# Patient Record
Sex: FEMALE | URBAN - METROPOLITAN AREA
[De-identification: names, ages, dates, MRNs, and addresses within clinical notes are randomized per-mention and may not be internally consistent; named-entity substitution may affect disease eponyms.]

---

## 2024-02-29 ENCOUNTER — APPOINTMENT (RX ONLY)
Dept: URBAN - METROPOLITAN AREA CLINIC 132 | Facility: CLINIC | Age: 74
Setting detail: DERMATOLOGY
End: 2024-02-29

## 2024-02-29 DIAGNOSIS — L40.8 OTHER PSORIASIS: ICD-10-CM

## 2024-02-29 DIAGNOSIS — L82.0 INFLAMED SEBORRHEIC KERATOSIS: ICD-10-CM

## 2024-02-29 PROCEDURE — ? PRESCRIPTION

## 2024-02-29 PROCEDURE — 99203 OFFICE O/P NEW LOW 30 MIN: CPT

## 2024-02-29 PROCEDURE — ? COUNSELING

## 2024-02-29 RX ORDER — BETAMETHASONE DIPROPIONATE 0.5 MG/G
OINTMENT TOPICAL
Qty: 45 | Refills: 3 | Status: ERX | COMMUNITY
Start: 2024-02-29

## 2024-02-29 RX ADMIN — BETAMETHASONE DIPROPIONATE: 0.5 OINTMENT TOPICAL at 00:00

## 2024-02-29 ASSESSMENT — LOCATION ZONE DERM
LOCATION ZONE: NECK
LOCATION ZONE: FACE
LOCATION ZONE: SCALP

## 2024-02-29 ASSESSMENT — LOCATION SIMPLE DESCRIPTION DERM
LOCATION SIMPLE: POSTERIOR SCALP
LOCATION SIMPLE: RIGHT ANTERIOR NECK
LOCATION SIMPLE: LEFT ANTERIOR NECK
LOCATION SIMPLE: LEFT FOREHEAD

## 2024-02-29 ASSESSMENT — LOCATION DETAILED DESCRIPTION DERM
LOCATION DETAILED: LEFT POSTAURICULAR SKIN
LOCATION DETAILED: LEFT SUPERIOR ANTERIOR NECK
LOCATION DETAILED: LEFT MEDIAL FOREHEAD
LOCATION DETAILED: RIGHT SUPERIOR LATERAL NECK

## 2025-02-17 ENCOUNTER — APPOINTMENT (OUTPATIENT)
Dept: URBAN - METROPOLITAN AREA CLINIC 132 | Facility: CLINIC | Age: 75
Setting detail: DERMATOLOGY
End: 2025-02-17

## 2025-02-17 DIAGNOSIS — L30.9 DERMATITIS, UNSPECIFIED: ICD-10-CM

## 2025-02-17 PROCEDURE — ? BIOPSY BY PUNCH METHOD

## 2025-02-17 PROCEDURE — ? COUNSELING

## 2025-02-17 PROCEDURE — 11104 PUNCH BX SKIN SINGLE LESION: CPT

## 2025-02-17 ASSESSMENT — LOCATION DETAILED DESCRIPTION DERM
LOCATION DETAILED: PERIUMBILICAL SKIN
LOCATION DETAILED: RIGHT DISTAL DORSAL FOREARM
LOCATION DETAILED: RIGHT PROXIMAL PRETIBIAL REGION
LOCATION DETAILED: LEFT PROXIMAL DORSAL FOREARM
LOCATION DETAILED: LEFT LATERAL PROXIMAL PRETIBIAL REGION
LOCATION DETAILED: LEFT LATERAL PROXIMAL PRETIBIAL REGION

## 2025-02-17 ASSESSMENT — LOCATION SIMPLE DESCRIPTION DERM
LOCATION SIMPLE: RIGHT PRETIBIAL REGION
LOCATION SIMPLE: ABDOMEN
LOCATION SIMPLE: LEFT FOREARM
LOCATION SIMPLE: RIGHT FOREARM
LOCATION SIMPLE: LEFT PRETIBIAL REGION
LOCATION SIMPLE: LEFT PRETIBIAL REGION

## 2025-02-17 ASSESSMENT — LOCATION ZONE DERM
LOCATION ZONE: LEG
LOCATION ZONE: ARM
LOCATION ZONE: TRUNK
LOCATION ZONE: LEG

## 2025-02-17 NOTE — PROCEDURE: BIOPSY BY PUNCH METHOD
Detail Level: Detailed
Was A Bandage Applied: Yes
Punch Size In Mm: 4
Size Of Lesion In Cm (Optional): 0
Depth Of Punch Biopsy: dermis
Biopsy Type: H and E
Anesthesia Type: 1% lidocaine with epinephrine
Hemostasis: None
Epidermal Sutures: 4-0 Polypropylene
Wound Care: Petrolatum
Dressing: Band-Aid
Suture Removal: 14 days
Patient Will Remove Sutures At Home?: No
Lab: [id number]
Lab Facility: [id number]
Consent: Written consent was obtained and risks were reviewed including but not limited to scarring, infection, bleeding, scabbing, incomplete removal, nerve damage and allergy to anesthesia.
Post-Care Instructions: I reviewed with the patient in detail post-care instructions. Patient is to keep the biopsy site dry overnight, and then apply bacitracin twice daily until healed. Patient may apply hydrogen peroxide soaks to remove any crusting.
Home Suture Removal Text: Patient was provided a home suture removal kit and will remove their sutures at home.  If they have any questions or difficulties they will call the office.
Notification Instructions: Patient will be notified of biopsy results. However, patient instructed to call the office if not contacted within 2 weeks.
Billing Type: Third-Party Bill
Information: Selecting Yes will display possible errors in your note based on the variables you have selected. This validation is only offered as a suggestion for you. PLEASE NOTE THAT THE VALIDATION TEXT WILL BE REMOVED WHEN YOU FINALIZE YOUR NOTE. IF YOU WANT TO FAX A PRELIMINARY NOTE YOU WILL NEED TO TOGGLE THIS TO 'NO' IF YOU DO NOT WANT IT IN YOUR FAXED NOTE.

## 2025-03-03 ENCOUNTER — APPOINTMENT (OUTPATIENT)
Dept: URBAN - METROPOLITAN AREA CLINIC 132 | Facility: CLINIC | Age: 75
Setting detail: DERMATOLOGY
End: 2025-03-03

## 2025-03-03 DIAGNOSIS — L259 CONTACT DERMATITIS AND OTHER ECZEMA, UNSPECIFIED CAUSE: ICD-10-CM

## 2025-03-03 DIAGNOSIS — Z48.02 ENCOUNTER FOR REMOVAL OF SUTURES: ICD-10-CM

## 2025-03-03 PROBLEM — L30.8 OTHER SPECIFIED DERMATITIS: Status: ACTIVE | Noted: 2025-03-03

## 2025-03-03 PROCEDURE — ? PRESCRIPTION

## 2025-03-03 PROCEDURE — ? COUNSELING

## 2025-03-03 PROCEDURE — 99213 OFFICE O/P EST LOW 20 MIN: CPT

## 2025-03-03 PROCEDURE — ? SUTURE REMOVAL (GLOBAL PERIOD)

## 2025-03-03 RX ORDER — TRIAMCINOLONE ACETONIDE 1 MG/G
CREAM TOPICAL
Qty: 454 | Refills: 2 | Status: ERX | COMMUNITY
Start: 2025-03-03

## 2025-03-03 RX ADMIN — TRIAMCINOLONE ACETONIDE: 1 CREAM TOPICAL at 00:00

## 2025-03-03 ASSESSMENT — LOCATION DETAILED DESCRIPTION DERM
LOCATION DETAILED: LEFT PROXIMAL DORSAL FOREARM
LOCATION DETAILED: LEFT DISTAL PRETIBIAL REGION
LOCATION DETAILED: LEFT LATERAL PROXIMAL PRETIBIAL REGION
LOCATION DETAILED: RIGHT PROXIMAL PRETIBIAL REGION
LOCATION DETAILED: RIGHT DISTAL DORSAL FOREARM

## 2025-03-03 ASSESSMENT — LOCATION ZONE DERM
LOCATION ZONE: ARM
LOCATION ZONE: LEG

## 2025-03-03 ASSESSMENT — LOCATION SIMPLE DESCRIPTION DERM
LOCATION SIMPLE: RIGHT FOREARM
LOCATION SIMPLE: LEFT PRETIBIAL REGION
LOCATION SIMPLE: LEFT FOREARM
LOCATION SIMPLE: RIGHT PRETIBIAL REGION

## 2025-03-03 NOTE — PROCEDURE: SUTURE REMOVAL (GLOBAL PERIOD)
Detail Level: Detailed
Add 21314 Cpt? (Important Note: In 2017 The Use Of 97437 Is Being Tracked By Cms To Determine Future Global Period Reimbursement For Global Periods): no

## 2025-05-12 ENCOUNTER — APPOINTMENT (OUTPATIENT)
Dept: URBAN - METROPOLITAN AREA CLINIC 132 | Facility: CLINIC | Age: 75
Setting detail: DERMATOLOGY
End: 2025-05-12

## 2025-05-12 DIAGNOSIS — L259 CONTACT DERMATITIS AND OTHER ECZEMA, UNSPECIFIED CAUSE: ICD-10-CM | Status: WORSENING

## 2025-05-12 PROBLEM — L30.8 OTHER SPECIFIED DERMATITIS: Status: ACTIVE | Noted: 2025-05-12

## 2025-05-12 PROCEDURE — ? DEFER

## 2025-05-12 PROCEDURE — ? COUNSELING

## 2025-05-12 PROCEDURE — 99213 OFFICE O/P EST LOW 20 MIN: CPT

## 2025-05-12 ASSESSMENT — LOCATION SIMPLE DESCRIPTION DERM
LOCATION SIMPLE: RIGHT PRETIBIAL REGION
LOCATION SIMPLE: RIGHT FOREARM
LOCATION SIMPLE: LEFT FOREARM
LOCATION SIMPLE: LEFT PRETIBIAL REGION

## 2025-05-12 ASSESSMENT — LOCATION ZONE DERM
LOCATION ZONE: LEG
LOCATION ZONE: ARM

## 2025-05-12 ASSESSMENT — LOCATION DETAILED DESCRIPTION DERM
LOCATION DETAILED: LEFT PROXIMAL DORSAL FOREARM
LOCATION DETAILED: LEFT DISTAL PRETIBIAL REGION
LOCATION DETAILED: RIGHT DISTAL DORSAL FOREARM
LOCATION DETAILED: RIGHT PROXIMAL PRETIBIAL REGION

## 2025-06-16 ENCOUNTER — APPOINTMENT (OUTPATIENT)
Dept: URBAN - METROPOLITAN AREA CLINIC 119 | Facility: CLINIC | Age: 75
Setting detail: DERMATOLOGY
End: 2025-06-16

## 2025-06-16 DIAGNOSIS — L23.9 ALLERGIC CONTACT DERMATITIS, UNSPECIFIED CAUSE: ICD-10-CM

## 2025-06-16 PROCEDURE — ? ADDITIONAL NOTES

## 2025-06-16 PROCEDURE — ? PATCH TESTING

## 2025-06-16 ASSESSMENT — LOCATION SIMPLE DESCRIPTION DERM: LOCATION SIMPLE: UPPER BACK

## 2025-06-16 ASSESSMENT — LOCATION ZONE DERM: LOCATION ZONE: TRUNK

## 2025-06-16 ASSESSMENT — LOCATION DETAILED DESCRIPTION DERM: LOCATION DETAILED: SUPERIOR THORACIC SPINE

## 2025-06-16 NOTE — PROCEDURE: ADDITIONAL NOTES
Detail Level: Simple
Additional Notes: UNAVAILABLE:\\n#1 Amerchol L- 101\\n#4 Bezisothiazolinone\\n#30 Hydroperoxides of Linalool\\n#31 Hydroperoxides of Limonene,\\n#36 HYDROXYISOHEXYL 3-CYCLOHEXENE CARBOXALDEHYDE \\n#51 Textile dye mix 2
Render Risk Assessment In Note?: no

## 2025-06-16 NOTE — PROCEDURE: PATCH TESTING
Consent: Written consent obtained, risks reviewed including but not limited to rash, itching, allergic reaction, systemic rash, remote possiblity of anaphylaxis to allergen.
Number Of Patches (Maximum Allowable Per Dos By Cms Is 90): 80
Post-Care Instructions: I reviewed with the patient in detail post-care instructions. Patient should not sweat, pick at, or get the patches wet for 48 hours.
Number Of Patches Placed (May Be More Than What Is Billed, Based On Payer Rules) - Do Not Duplicate If Number Placed Is Number Billed: 74
Detail Level: Simple

## 2025-06-18 ENCOUNTER — APPOINTMENT (OUTPATIENT)
Dept: URBAN - METROPOLITAN AREA CLINIC 119 | Facility: CLINIC | Age: 75
Setting detail: DERMATOLOGY
End: 2025-06-18

## 2025-06-18 DIAGNOSIS — L23.9 ALLERGIC CONTACT DERMATITIS, UNSPECIFIED CAUSE: ICD-10-CM

## 2025-06-18 PROCEDURE — ? NORTH AMERICAN 80 PATCH TEST READING

## 2025-06-18 PROCEDURE — ? ADDITIONAL NOTES

## 2025-06-18 ASSESSMENT — LOCATION SIMPLE DESCRIPTION DERM: LOCATION SIMPLE: UPPER BACK

## 2025-06-18 ASSESSMENT — LOCATION DETAILED DESCRIPTION DERM: LOCATION DETAILED: SUPERIOR THORACIC SPINE

## 2025-06-18 ASSESSMENT — LOCATION ZONE DERM: LOCATION ZONE: TRUNK

## 2025-06-18 NOTE — PROCEDURE: NORTH AMERICAN 78 PATCH TEST READING
Allergen 20 Reaction: no reaction
Name Of Allergen 6: BENZYL ALCOHOL
Name Of Allergen 54: 2-n-Octyl-4-isothiazolin-3-one
Name Of Allergen 20: 1,3-Diphenylguanidine
Name Of Allergen 5: Benzocaine
Name Of Allergen 79: OLEAMIDOPROPYL DIMETHYLAMINE
Name Of Allergen 57: PROPYL GALLATE
Name Of Allergen 71: Amidoamine
Name Of Allergen 55: p-PHENYLENEDIAMINE (PPD)
Name Of Allergen 60: Pramoxine hydrochloride
Name Of Allergen 63: SORBITAN SESQUIOLEATE
Name Of Allergen 17: COLOPHONIUM
Name Of Allergen 27: Ethylenediamine dihydrochloride
Name Of Allergen 80: PROPYLENE GLYCOL
Name Of Allergen 7: BENZYL SALICYLATE
Name Of Allergen 59: Polymyxin B sulfate
Name Of Allergen 61: SODIUM BENZOATE
Name Of Allergen 39: Methyl methacrylate
Name Of Allergen 29: BENZOPHENONE-4
What Reading Time Point?: 48 hour
Name Of Allergen 45: Fragrance mix I Mx-07
Name Of Allergen 46: Sesquiterpene lactone mix Mx-18
Name Of Allergen 19: TOLUENE-2,5-DIAMINE SULFATE
Name Of Allergen 34: IODOPROPYNYL BUTYLCARBAMATE
Name Of Allergen 11: Budesonide
Name Of Allergen 70: Ylang ylang oil
Name Of Allergen 41: Paraben mix Mx-03A
Name Of Allergen 56: Potassium dichromate
Name Of Allergen 37: LAURYL POLYGLUCOSE
Name Of Allergen 23: METHYLDIBROMO GLUTARONITRILE
Name Of Allergen 21: DIAZOLIDINYL UREA
Name Of Allergen 68: TOCOPHEROL
Name Of Allergen 43: Mercapto mix Mx-05B
Number Of Patches Read: 74
Name Of Allergen 65: Toluenesulfonamide formaldehyde resin
Name Of Allergen 48: Mixed dialkyl thiourea Mx-24
Name Of Allergen 73: CHLORHEXIDINE DIGLUCONATE
Name Of Allergen 32: IMIDAZOLIDINYL UREA
Name Of Allergen 58: Propolis
Name Of Allergen 35: Lidocaine
Name Of Allergen 64: SODIUM METABISULFITE
Name Of Allergen 33: N-Isopropyl-N-phenyl-4-phenylenediamine (IPPD)
Name Of Allergen 67: Tea Tree Oil oxidized
Name Of Allergen 26: Ethyl acrylate
Name Of Allergen 12: QUATERNIUM-15
Name Of Allergen 18: Clobetasol-17-propionate
Name Of Allergen 49: Fragrance mix II Mx-25
Show Negative Results In The Note?: Yes
Name Of Allergen 47: Jovanny mix III Mx-19
Name Of Allergen 25: Epoxy resin, Bisphenol A
Name Of Allergen 3: [place] balsam
Name Of Allergen 66: Tixocortol-21-pivalate
Name Of Allergen 38: 2-Mercaptobenzothiazole (MBT)
Name Of Allergen 52: Neomycin sulfate
Name Of Allergen 40: Thiuram mix Mx-01
Name Of Allergen 53: Nickel(II)sulfate hexahydrate
Name Of Allergen 15: Cobalt(II)chloride hexahydrate
Name Of Allergen 50: Compositae mix II Mx-29A
Name Of Allergen 69: LANOLIN ALCOHOL
Name Of Allergen 75: COCAMIDOPROPYL BETAINE
Name Of Allergen 13: CHLOROXYLENOL (PCMX)
Name Of Allergen 76: 3-(Dimethylamino)-1-propylamine
Name Of Allergen 74: METHYLISOTHIAZOLINONE+ METHYLCHLOROISOTHIAZOLINONE
Name Of Allergen 14: CINNAMAL
Name Of Allergen 2: AMMONIUM PERSULFATE
Name Of Allergen 62: SORBITAN OLEATE
Name Of Allergen 22: DMDM HYDANTOIN
Name Of Allergen 78: METHYLISOTHIAZOLINONE
Name Of Allergen 28: 2-Hydroxyethyl methacrylate
Name Of Allergen 72: BENZALKONIUM CHLORIDE
Name Of Allergen 24: DECYL GLUCOSIDE
Name Of Allergen 16: COCAMIDE CARLOS ALBERTO
Name Of Allergen 9: 0-vstc-Qfzljaubirxyuamohixjflq resin (PTBP)
Name Of Allergen 42: Black rubber mix Mx-04
Detail Level: Zone
Name Of Allergen 10: Bacitracin
Name Of Allergen 44: Carba mix Mx-06
Name Of Allergen 77: FORMALDEHYDE
Name Of Allergen 8: 2-BROMO-2-NITROPROPANE-1,3-DIOL

## 2025-06-18 NOTE — PROCEDURE: ADDITIONAL NOTES
Detail Level: Simple
Additional Notes: NOT AVAILABLE:\\n#1 Amerchol L-101\\n#4 Bezisothiazolinone\\n#30 Hydroperoxides of Linalool\\n#31 Hydroperoxides of Limonene,\\n#36 HYDROXYISOHEXYL 3-CYCLOHEXENE CARBOXALDEHYDE\\n#51 Textile dye mix 2
Render Risk Assessment In Note?: no

## 2025-06-20 ENCOUNTER — APPOINTMENT (OUTPATIENT)
Dept: URBAN - METROPOLITAN AREA CLINIC 119 | Facility: CLINIC | Age: 75
Setting detail: DERMATOLOGY
End: 2025-06-20

## 2025-06-20 DIAGNOSIS — L23.9 ALLERGIC CONTACT DERMATITIS, UNSPECIFIED CAUSE: ICD-10-CM

## 2025-06-20 PROCEDURE — ? ADDITIONAL NOTES

## 2025-06-20 PROCEDURE — ? NORTH AMERICAN 80 PATCH TEST READING

## 2025-06-20 ASSESSMENT — LOCATION SIMPLE DESCRIPTION DERM: LOCATION SIMPLE: UPPER BACK

## 2025-06-20 ASSESSMENT — LOCATION DETAILED DESCRIPTION DERM: LOCATION DETAILED: SUPERIOR THORACIC SPINE

## 2025-06-20 ASSESSMENT — LOCATION ZONE DERM: LOCATION ZONE: TRUNK

## 2025-06-20 NOTE — PROCEDURE: NORTH AMERICAN 78 PATCH TEST READING
Allergen 20 Reaction: no reaction
Name Of Allergen 6: BENZYL ALCOHOL
Name Of Allergen 54: 2-n-Octyl-4-isothiazolin-3-one
Name Of Allergen 20: 1,3-Diphenylguanidine
Name Of Allergen 5: Benzocaine
Name Of Allergen 79: OLEAMIDOPROPYL DIMETHYLAMINE
Name Of Allergen 57: PROPYL GALLATE
Name Of Allergen 71: Amidoamine
Name Of Allergen 55: p-PHENYLENEDIAMINE (PPD)
Name Of Allergen 60: Pramoxine hydrochloride
Name Of Allergen 63: SORBITAN SESQUIOLEATE
Name Of Allergen 17: COLOPHONIUM
Name Of Allergen 27: Ethylenediamine dihydrochloride
Name Of Allergen 80: PROPYLENE GLYCOL
Name Of Allergen 7: BENZYL SALICYLATE
Name Of Allergen 59: Polymyxin B sulfate
Name Of Allergen 61: SODIUM BENZOATE
Name Of Allergen 39: Methyl methacrylate
Name Of Allergen 29: BENZOPHENONE-4
What Reading Time Point?: 96 hour
Name Of Allergen 45: Fragrance mix I Mx-07
Name Of Allergen 46: Sesquiterpene lactone mix Mx-18
Name Of Allergen 19: TOLUENE-2,5-DIAMINE SULFATE
Name Of Allergen 34: IODOPROPYNYL BUTYLCARBAMATE
Name Of Allergen 11: Budesonide
Name Of Allergen 70: Ylang ylang oil
Name Of Allergen 41: Paraben mix Mx-03A
Name Of Allergen 56: Potassium dichromate
Name Of Allergen 37: LAURYL POLYGLUCOSE
Name Of Allergen 23: METHYLDIBROMO GLUTARONITRILE
Name Of Allergen 21: DIAZOLIDINYL UREA
Name Of Allergen 68: TOCOPHEROL
Name Of Allergen 43: Mercapto mix Mx-05B
Number Of Patches Read: 74
Name Of Allergen 65: Toluenesulfonamide formaldehyde resin
Name Of Allergen 48: Mixed dialkyl thiourea Mx-24
Name Of Allergen 73: CHLORHEXIDINE DIGLUCONATE
Name Of Allergen 32: IMIDAZOLIDINYL UREA
Allergen 64 Reaction: 1+
Name Of Allergen 58: Propolis
Name Of Allergen 35: Lidocaine
Name Of Allergen 64: SODIUM METABISULFITE
Name Of Allergen 33: N-Isopropyl-N-phenyl-4-phenylenediamine (IPPD)
Name Of Allergen 67: Tea Tree Oil oxidized
Name Of Allergen 26: Ethyl acrylate
Name Of Allergen 12: QUATERNIUM-15
Name Of Allergen 18: Clobetasol-17-propionate
Name Of Allergen 49: Fragrance mix II Mx-25
Show Negative Results In The Note?: Yes
Name Of Allergen 47: Jovanny mix III Mx-19
Name Of Allergen 25: Epoxy resin, Bisphenol A
Name Of Allergen 3: [place] balsam
Name Of Allergen 66: Tixocortol-21-pivalate
Name Of Allergen 38: 2-Mercaptobenzothiazole (MBT)
Name Of Allergen 52: Neomycin sulfate
Name Of Allergen 40: Thiuram mix Mx-01
Name Of Allergen 53: Nickel(II)sulfate hexahydrate
Name Of Allergen 15: Cobalt(II)chloride hexahydrate
Name Of Allergen 50: Compositae mix II Mx-29A
Name Of Allergen 69: LANOLIN ALCOHOL
Name Of Allergen 75: COCAMIDOPROPYL BETAINE
Name Of Allergen 13: CHLOROXYLENOL (PCMX)
Name Of Allergen 76: 3-(Dimethylamino)-1-propylamine
Name Of Allergen 74: METHYLISOTHIAZOLINONE+ METHYLCHLOROISOTHIAZOLINONE
Name Of Allergen 14: CINNAMAL
Name Of Allergen 2: AMMONIUM PERSULFATE
Name Of Allergen 62: SORBITAN OLEATE
Name Of Allergen 22: DMDM HYDANTOIN
Name Of Allergen 78: METHYLISOTHIAZOLINONE
Name Of Allergen 28: 2-Hydroxyethyl methacrylate
Allergen 15 Reaction: +/-
Name Of Allergen 72: BENZALKONIUM CHLORIDE
Name Of Allergen 24: DECYL GLUCOSIDE
Name Of Allergen 16: COCAMIDE CARLOS ALBERTO
Name Of Allergen 9: 9-uxcm-Btzhbzmvbgqjyykraiyaaek resin (PTBP)
Name Of Allergen 42: Black rubber mix Mx-04
Detail Level: Zone
Allergen 60 Reaction: 2+
Name Of Allergen 10: Bacitracin
Name Of Allergen 44: Carba mix Mx-06
Name Of Allergen 77: FORMALDEHYDE
Name Of Allergen 8: 2-BROMO-2-NITROPROPANE-1,3-DIOL